# Patient Record
Sex: FEMALE | Race: WHITE | NOT HISPANIC OR LATINO | Employment: FULL TIME | URBAN - METROPOLITAN AREA
[De-identification: names, ages, dates, MRNs, and addresses within clinical notes are randomized per-mention and may not be internally consistent; named-entity substitution may affect disease eponyms.]

---

## 2023-10-10 ENCOUNTER — OFFICE VISIT (OUTPATIENT)
Dept: PRIMARY CARE | Facility: CLINIC | Age: 38
End: 2023-10-10
Payer: COMMERCIAL

## 2023-10-10 VITALS
HEART RATE: 70 BPM | WEIGHT: 142 LBS | HEIGHT: 66 IN | BODY MASS INDEX: 22.82 KG/M2 | TEMPERATURE: 98.2 F | OXYGEN SATURATION: 98 % | SYSTOLIC BLOOD PRESSURE: 102 MMHG | DIASTOLIC BLOOD PRESSURE: 70 MMHG

## 2023-10-10 DIAGNOSIS — H66.90 OTITIS MEDIA, UNSPECIFIED LATERALITY, UNSPECIFIED OTITIS MEDIA TYPE: ICD-10-CM

## 2023-10-10 DIAGNOSIS — J02.9 PHARYNGITIS, UNSPECIFIED ETIOLOGY: Primary | ICD-10-CM

## 2023-10-10 LAB — POC RAPID STREP: NEGATIVE

## 2023-10-10 PROCEDURE — 99214 OFFICE O/P EST MOD 30 MIN: CPT | Performed by: FAMILY MEDICINE

## 2023-10-10 PROCEDURE — 83036 HEMOGLOBIN GLYCOSYLATED A1C: CPT | Performed by: FAMILY MEDICINE

## 2023-10-10 PROCEDURE — 1036F TOBACCO NON-USER: CPT | Performed by: FAMILY MEDICINE

## 2023-10-10 RX ORDER — AMOXICILLIN 875 MG/1
875 TABLET, FILM COATED ORAL 2 TIMES DAILY
Qty: 20 TABLET | Refills: 0 | Status: SHIPPED | OUTPATIENT
Start: 2023-10-10 | End: 2023-10-20

## 2023-10-10 ASSESSMENT — LIFESTYLE VARIABLES: HOW OFTEN DO YOU HAVE A DRINK CONTAINING ALCOHOL: 2-4 TIMES A MONTH

## 2023-10-10 ASSESSMENT — COLUMBIA-SUICIDE SEVERITY RATING SCALE - C-SSRS
6. HAVE YOU EVER DONE ANYTHING, STARTED TO DO ANYTHING, OR PREPARED TO DO ANYTHING TO END YOUR LIFE?: NO
1. IN THE PAST MONTH, HAVE YOU WISHED YOU WERE DEAD OR WISHED YOU COULD GO TO SLEEP AND NOT WAKE UP?: NO
2. HAVE YOU ACTUALLY HAD ANY THOUGHTS OF KILLING YOURSELF?: NO

## 2023-10-10 ASSESSMENT — ENCOUNTER SYMPTOMS: SORE THROAT: 1

## 2023-10-10 ASSESSMENT — PATIENT HEALTH QUESTIONNAIRE - PHQ9
2. FEELING DOWN, DEPRESSED OR HOPELESS: NOT AT ALL
SUM OF ALL RESPONSES TO PHQ9 QUESTIONS 1 AND 2: 0
1. LITTLE INTEREST OR PLEASURE IN DOING THINGS: NOT AT ALL

## 2023-10-10 NOTE — PROGRESS NOTES
"Subjective   Patient ID: Tiffani Solomon is a 38 y.o. female who presents for Sore Throat (Pt is here for sore throat and ear infection ).    Sore throat, right earache for 4 days, son has a sore throat.    Sore Throat          Review of Systems   HENT:  Positive for sore throat.    All other systems reviewed and are negative.      Objective   /70   Pulse 70   Temp 36.8 °C (98.2 °F)   Ht 1.676 m (5' 6\")   Wt 64.4 kg (142 lb)   SpO2 98%   BMI 22.92 kg/m²     Physical Exam  Vitals and nursing note reviewed.   Constitutional:       Appearance: Normal appearance.   HENT:      Head: Normocephalic and atraumatic.      Ears:      Comments: Right tm red and bulging     Nose: Nose normal.      Mouth/Throat:      Mouth: Mucous membranes are moist.      Pharynx: Oropharynx is clear. Posterior oropharyngeal erythema present.   Eyes:      Extraocular Movements: Extraocular movements intact.      Conjunctiva/sclera: Conjunctivae normal.      Pupils: Pupils are equal, round, and reactive to light.   Cardiovascular:      Rate and Rhythm: Normal rate and regular rhythm.      Pulses: Normal pulses.   Pulmonary:      Effort: Pulmonary effort is normal.      Breath sounds: Normal breath sounds.   Abdominal:      General: Bowel sounds are normal.      Palpations: Abdomen is soft.   Musculoskeletal:         General: Normal range of motion.      Cervical back: Normal range of motion and neck supple.   Skin:     General: Skin is warm and dry.      Capillary Refill: Capillary refill takes less than 2 seconds.   Neurological:      General: No focal deficit present.      Mental Status: She is alert.   Psychiatric:         Mood and Affect: Mood normal.         Behavior: Behavior normal.         Thought Content: Thought content normal.         Judgment: Judgment normal.         Assessment/Plan          "

## 2025-01-27 ENCOUNTER — HOSPITAL ENCOUNTER (OUTPATIENT)
Dept: RADIOLOGY | Facility: CLINIC | Age: 40
Discharge: HOME | End: 2025-01-27
Payer: COMMERCIAL

## 2025-01-27 ENCOUNTER — OFFICE VISIT (OUTPATIENT)
Dept: ORTHOPEDIC SURGERY | Facility: CLINIC | Age: 40
End: 2025-01-27
Payer: COMMERCIAL

## 2025-01-27 DIAGNOSIS — M25.511 RIGHT SHOULDER PAIN, UNSPECIFIED CHRONICITY: ICD-10-CM

## 2025-01-27 DIAGNOSIS — S43.431S LABRAL TEAR OF SHOULDER, RIGHT, SEQUELA: ICD-10-CM

## 2025-01-27 DIAGNOSIS — M25.511 RIGHT SHOULDER PAIN, UNSPECIFIED CHRONICITY: Primary | ICD-10-CM

## 2025-01-27 PROCEDURE — 73030 X-RAY EXAM OF SHOULDER: CPT | Mod: RT

## 2025-01-27 PROCEDURE — 99204 OFFICE O/P NEW MOD 45 MIN: CPT | Performed by: ORTHOPAEDIC SURGERY

## 2025-01-27 PROCEDURE — 99213 OFFICE O/P EST LOW 20 MIN: CPT | Performed by: ORTHOPAEDIC SURGERY

## 2025-01-27 PROCEDURE — 73030 X-RAY EXAM OF SHOULDER: CPT | Mod: RIGHT SIDE | Performed by: RADIOLOGY

## 2025-01-27 RX ORDER — MELOXICAM 15 MG/1
15 TABLET ORAL
Qty: 30 TABLET | Refills: 0 | Status: SHIPPED | OUTPATIENT
Start: 2025-01-27

## 2025-01-27 ASSESSMENT — PAIN SCALES - GENERAL: PAINLEVEL_OUTOF10: 2

## 2025-01-27 ASSESSMENT — PAIN - FUNCTIONAL ASSESSMENT: PAIN_FUNCTIONAL_ASSESSMENT: 0-10

## 2025-01-27 NOTE — PROGRESS NOTES
History of Present Illness:   Patient presents today for evaluation of right shoulder pains been going on for several months.  She denies any obvious acute injury but states overhead movements and movements out of the side bother her quite a bit.  She notes pinching sensations along the anterior superior aspect of the shoulder.  These are worsened with activity, improved with rest.  She is very physically active, right-hand-dominant.  She participates in [employer], also enjoys playing volleyball.  She notes that these activities have gotten substantially worse given her shoulder pain.    Past Medical History:   Diagnosis Date    Personal history of other infectious and parasitic diseases     History of chickenpox    Personal history of other mental and behavioral disorders     History of depression     Past Surgical History:   Procedure Laterality Date    OTHER SURGICAL HISTORY  07/20/2020    Cholecystectomy     No current outpatient medications on file.    Review of Systems   GENERAL: Negative  GI: Negative  MUSCULOSKELETAL: See HPI  SKIN: Negative  NEURO:  Negative    Physical Examination:  Right Shoulder:    Skin healthy to gross inspection  No ecchymosis, no swelling, no gross atrophy  No tenderness to palpation over acromioclavicular joint  No tenderness to palpation over biceps tendon  No tenderness to palpation over the cervical spine     ROM:  Full forward flexion  Full external rotation  IR to thoracic spine, symmetric to contralateral side  Strength:  5-/5 Resisted elevation  5/5 Resisted external rotation  Negative lift off test   Negative Spurling´s test  Positive Neer and Hawking´s test  Positive Speeds's, Domínguez's active compression test  Neurovascular exam normal distally     Imaging:  Plain films right shoulder today reveal no acute fractures or dislocation, good alignment position, no significant evidence of DJD noted.    Assessment:   Patient with right shoulder pain concern for SLAP pathology,  possible long head biceps tendon involvement with low suspicion for partial-thickness supraspinatus tear    Plan:  We reviewed a variety of treatment option for the patient including rest, ice, anti-inflammatories.  We encouraged physical therapy as well as home exercise program, activity modification/restriction, possible corticosteroid injection as well as MR arthrogram.  Patient agreeable to arthrogram, NSAID prescription, as well as home exercises    Syd Aceves PA-C      In a face to face encounter, I evaluated the patient and performed a physical examination, discussed pertinent diagnostic studies if indicated and discussed diagnosis and management strategies with both the patient and physician assistant / nurse practitioner.  I reviewed the PA/NP's note and agree with the documented findings and plan of care.    Patient with chronic shoulder pain active with working out as well as sports.  Exam concerning for labral pathology recommend MR arthrogram.    A nonsteroidal anti-inflammatory drug (NSAID) was prescribed.  We reviewed the risks (including gastric issues, renal issues) and benefits of the medication.  We discussed a scheduled course if no adverse reactions to help with swelling / pain.  We discussed that chronic usage should be checked with primary care physician.        Chris Rodney MD

## 2025-02-14 ENCOUNTER — HOSPITAL ENCOUNTER (OUTPATIENT)
Dept: RADIOLOGY | Facility: HOSPITAL | Age: 40
Discharge: HOME | End: 2025-02-14
Payer: COMMERCIAL

## 2025-02-14 DIAGNOSIS — S43.431S LABRAL TEAR OF SHOULDER, RIGHT, SEQUELA: ICD-10-CM

## 2025-02-14 DIAGNOSIS — M25.511 RIGHT SHOULDER PAIN, UNSPECIFIED CHRONICITY: ICD-10-CM

## 2025-02-14 PROCEDURE — 2550000001 HC RX 255 CONTRASTS: Performed by: ORTHOPAEDIC SURGERY

## 2025-02-14 PROCEDURE — A9575 INJ GADOTERATE MEGLUMI 0.1ML: HCPCS | Performed by: ORTHOPAEDIC SURGERY

## 2025-02-14 PROCEDURE — 73222 MRI JOINT UPR EXTREM W/DYE: CPT | Mod: RT

## 2025-02-14 PROCEDURE — 77002 NEEDLE LOCALIZATION BY XRAY: CPT | Mod: RT

## 2025-02-14 PROCEDURE — 2550000001 HC RX 255 CONTRASTS: Performed by: STUDENT IN AN ORGANIZED HEALTH CARE EDUCATION/TRAINING PROGRAM

## 2025-02-14 RX ORDER — GADOTERATE MEGLUMINE 376.9 MG/ML
1 INJECTION INTRAVENOUS
Status: COMPLETED | OUTPATIENT
Start: 2025-02-14 | End: 2025-02-14

## 2025-02-14 RX ORDER — LIDOCAINE HYDROCHLORIDE 10 MG/ML
10 INJECTION, SOLUTION EPIDURAL; INFILTRATION; INTRACAUDAL; PERINEURAL ONCE
Status: CANCELLED | OUTPATIENT
Start: 2025-02-14 | End: 2025-02-14

## 2025-02-14 RX ADMIN — IOHEXOL 8 ML: 180 INJECTION INTRAVENOUS at 11:25

## 2025-02-14 RX ADMIN — GADOTERATE MEGLUMINE 1 ML: 376.9 INJECTION INTRAVENOUS at 11:56

## 2025-02-14 NOTE — PRE-PROCEDURE NOTE
Interventional Radiology Preprocedure Note    Indication for procedure: Diagnoses of Right shoulder pain, unspecified chronicity and Labral tear of shoulder, right, sequela were pertinent to this visit.    Relevant Labs:   Lab Results   Component Value Date    CREATININE 0.82 06/24/2020    INR 1.0 06/24/2020    PROTIME 11.6 06/24/2020       Planned Sedation/Anesthesia: Local Anesthesia    Benefits, risks and alternatives of procedure and planned sedation have been discussed with the patient and/or their representative. All questions answered and they agree to proceed.    Site Marked: Yes, Right Shoulder    Tiffani Solomon is appropriate candidate for right shoulder Arthrogram

## 2025-02-14 NOTE — POST-PROCEDURE NOTE
Interventional Radiology Brief Postprocedure Note    Attending: Stewart Minor    Diagnosis: Right Shoulder Pain    Description of procedure: Under fluoroscopic guidance and sterile techniques , 8 ml diluted Gd contrast was injected into right shoulder Joint     Anesthesia:  Local    Complications: None    Estimated Blood Loss: none    Medications (Filter: Administrations occurring from 1323 to 1323 on 02/14/25) As of 02/14/25 1323      None          No specimens collected      See detailed result report with images in PACS.    The patient tolerated the procedure well without incident or complication and is in stable condition.

## 2025-03-14 ENCOUNTER — APPOINTMENT (OUTPATIENT)
Dept: ORTHOPEDIC SURGERY | Facility: CLINIC | Age: 40
End: 2025-03-14
Payer: COMMERCIAL

## 2025-03-14 DIAGNOSIS — S43.431S LABRAL TEAR OF SHOULDER, RIGHT, SEQUELA: ICD-10-CM

## 2025-03-14 NOTE — PROGRESS NOTES
History of Present Illness:   Patient is today for ongoing evaluation of right shoulder pain and ongoing mechanical symptoms with pinching sensations with overhead lifting and movements out to the side.  She is here today to review MRI and discuss treatment options.  She is very physically active in the summer and currently works as a , this is her busy season.    Review of Systems   GENERAL: Negative for malaise, significant weight loss, fever  MUSCULOSKELETAL: see HPI  NEURO:  Negative    Physical Examination:  Right Shoulder:    Skin healthy to gross inspection  No ecchymosis, no swelling, no gross atrophy  No tenderness to palpation over acromioclavicular joint  No tenderness to palpation over biceps tendon  No tenderness to palpation over the cervical spine     ROM:  Full forward flexion  Full external rotation  IR to thoracic spine, symmetric to contralateral side  Strength:  5-/5 Resisted elevation  5/5 Resisted external rotation  Negative lift off test   Negative Spurling´s test  Positive Neer and Hawking´s test  Positive Speeds's, Domínguez's active compression test  Neurovascular exam normal distally     Imaging:  Mri reveals:   Contrast is present within the glenohumeral joint. There is abnormal  contrast signal intensity undermining the anterosuperior labrum at  its base which is irregularly marginated, particularly at its  inferior aspect (series 3, image 15). The gap between the  anterosuperior labrum and the underlying articular cartilage measures  greater than 2.5 mm.      Evaluation of the glenohumeral articulation demonstrates no articular  cartilage defects.      The middle and inferior glenohumeral ligaments are intact. The  superior glenohumeral ligament is grossly intact.      There is minimal acromioclavicular joint osteoarthrosis.    Assessment:   Patient with right shoulder pain, chondral labral junction tear    Plan:  We discussed a variety of treatment option with the  patient to rest, ice, anti-inflammatories as well as injectable options and surgical options.  We reviewed extensively options for labral repair/capsulorrhaphy versus biceps tenodesis, she is agreeable to this but would like to wait until the fall 2025 as she is a  and very active in the summer.  For now we recommended continuing anti-inflammatory regimen and home therapy.  Discussed possibility of formal PT down the road if she would like.     Syd Aceves PA-C     In a face to face encounter, I evaluated the patient and performed a physical examination, discussed pertinent diagnostic studies if indicated and discussed diagnosis and management strategies with both the patient and physician assistant / nurse practitioner.  I reviewed the PA/NP's note and agree with the documented findings and plan of care.    Patient with anterior superior labral tear.  We reviewed arthroscopic shoulder surgery with labral repair versus biceps tenodesis.  The procedure risk benefits and recovery were discussed.  We also discussed appropriate workouts in the interim.    Patient is considering possible surgery in the fall we discussed reaching out to schedule she would like to pursue surgical intervention.  Patient was amenable to this plan.    Chris Rodney MD

## 2025-09-12 ENCOUNTER — APPOINTMENT (OUTPATIENT)
Dept: ORTHOPEDIC SURGERY | Facility: CLINIC | Age: 40
End: 2025-09-12
Payer: COMMERCIAL